# Patient Record
Sex: MALE | Race: OTHER | NOT HISPANIC OR LATINO
[De-identification: names, ages, dates, MRNs, and addresses within clinical notes are randomized per-mention and may not be internally consistent; named-entity substitution may affect disease eponyms.]

---

## 2020-04-26 ENCOUNTER — MESSAGE (OUTPATIENT)
Age: 30
End: 2020-04-26

## 2020-05-06 ENCOUNTER — APPOINTMENT (OUTPATIENT)
Dept: DISASTER EMERGENCY | Facility: HOSPITAL | Age: 30
End: 2020-05-06

## 2020-05-07 LAB
SARS-COV-2 IGG SERPL IA-ACNC: 1 AU/ML
SARS-COV-2 IGG SERPL QL IA: NEGATIVE

## 2020-05-11 ENCOUNTER — APPOINTMENT (OUTPATIENT)
Dept: DISASTER EMERGENCY | Facility: HOSPITAL | Age: 30
End: 2020-05-11

## 2023-04-12 PROBLEM — Z00.00 ENCOUNTER FOR PREVENTIVE HEALTH EXAMINATION: Status: ACTIVE | Noted: 2023-04-12

## 2023-04-26 ENCOUNTER — APPOINTMENT (OUTPATIENT)
Dept: OTOLARYNGOLOGY | Facility: CLINIC | Age: 33
End: 2023-04-26
Payer: COMMERCIAL

## 2023-04-26 DIAGNOSIS — J34.89 OTHER SPECIFIED DISORDERS OF NOSE AND NASAL SINUSES: ICD-10-CM

## 2023-04-26 PROCEDURE — 31231 NASAL ENDOSCOPY DX: CPT

## 2023-04-26 PROCEDURE — 99204 OFFICE O/P NEW MOD 45 MIN: CPT | Mod: 25

## 2023-04-26 NOTE — HISTORY OF PRESENT ILLNESS
[de-identified] : Right side more than left nasal obstruction, recurrent sinus infections\par no epistaxis, some bloody secreitons\par frequent throat clearing, concerned for PND\par no reflux issues, no dysphagia\par no meds\par tried flonase and saline, no effect, can't tolerate it due to HA\par some facial pain and headaches\par no PMH, no PSH\par has to clear by lying on side\par FH NSD\par no SOB, no otologic issues, no AOM, no HL\par 1-2 infections per year for many years\par

## 2023-04-28 ENCOUNTER — APPOINTMENT (OUTPATIENT)
Dept: CT IMAGING | Facility: HOSPITAL | Age: 33
End: 2023-04-28

## 2023-04-28 ENCOUNTER — OUTPATIENT (OUTPATIENT)
Dept: OUTPATIENT SERVICES | Facility: HOSPITAL | Age: 33
LOS: 1 days | End: 2023-04-28
Payer: COMMERCIAL

## 2023-04-28 DIAGNOSIS — J34.89 OTHER SPECIFIED DISORDERS OF NOSE AND NASAL SINUSES: ICD-10-CM

## 2023-04-28 PROCEDURE — 70486 CT MAXILLOFACIAL W/O DYE: CPT | Mod: 26

## 2023-05-30 ENCOUNTER — APPOINTMENT (OUTPATIENT)
Dept: OTOLARYNGOLOGY | Facility: CLINIC | Age: 33
End: 2023-05-30
Payer: COMMERCIAL

## 2023-05-30 PROCEDURE — 99214 OFFICE O/P EST MOD 30 MIN: CPT | Mod: 95

## 2023-05-30 RX ORDER — IPRATROPIUM BROMIDE 42 UG/1
0.06 SPRAY NASAL
Qty: 1 | Refills: 1 | Status: ACTIVE | COMMUNITY
Start: 2023-05-30 | End: 1900-01-01

## 2023-06-04 NOTE — HISTORY OF PRESENT ILLNESS
[de-identified] : Right side more than left nasal obstruction, recurrent sinus infections\par has tried sprays multiple times since last visit, couldn't tolerate, gave migraines or congestion\par no epistaxis, mild dried blood\par no reflux issues, no dysphagia\par some facial pain and headaches\par no PMH, no PSH\par has to clear by lying on side\par FH NSD\par no SOB, no otologic issues, no AOM, no HL\par 1-2 infections per year for many years\par

## 2023-08-23 ENCOUNTER — APPOINTMENT (OUTPATIENT)
Dept: OTOLARYNGOLOGY | Facility: CLINIC | Age: 33
End: 2023-08-23
Payer: COMMERCIAL

## 2023-08-23 VITALS
SYSTOLIC BLOOD PRESSURE: 165 MMHG | WEIGHT: 280 LBS | DIASTOLIC BLOOD PRESSURE: 112 MMHG | HEIGHT: 71 IN | HEART RATE: 93 BPM | OXYGEN SATURATION: 97 % | BODY MASS INDEX: 39.2 KG/M2

## 2023-08-23 DIAGNOSIS — R09.89 OTHER SPECIFIED SYMPTOMS AND SIGNS INVOLVING THE CIRCULATORY AND RESPIRATORY SYSTEMS: ICD-10-CM

## 2023-08-23 DIAGNOSIS — J34.3 HYPERTROPHY OF NASAL TURBINATES: ICD-10-CM

## 2023-08-23 DIAGNOSIS — R09.81 NASAL CONGESTION: ICD-10-CM

## 2023-08-23 DIAGNOSIS — J34.2 DEVIATED NASAL SEPTUM: ICD-10-CM

## 2023-08-23 DIAGNOSIS — J32.9 CHRONIC SINUSITIS, UNSPECIFIED: ICD-10-CM

## 2023-08-23 PROCEDURE — 31231 NASAL ENDOSCOPY DX: CPT

## 2023-08-23 PROCEDURE — 99214 OFFICE O/P EST MOD 30 MIN: CPT | Mod: 25

## 2023-08-23 RX ORDER — AZELASTINE HYDROCHLORIDE 137 UG/1
0.1 SPRAY, METERED NASAL TWICE DAILY
Qty: 1 | Refills: 3 | Status: COMPLETED | COMMUNITY
Start: 2023-04-26 | End: 2023-08-23

## 2023-08-23 RX ORDER — MOMETASONE 50 UG/1
50 SPRAY, METERED NASAL
Qty: 1 | Refills: 1 | Status: COMPLETED | COMMUNITY
Start: 2023-04-26 | End: 2023-08-23

## 2023-08-23 NOTE — HISTORY OF PRESENT ILLNESS
[de-identified] : 33 year old male follow up CRS States continues to have nasal congestion R>L, using ipratropium nasal sprays with improvement in dryness but not congestion.  Reports intermittent sinus pain and pressure,  Reports intermittent blood in mucus, denies epistaxis. Denies sinus infections since last visit, anterior rhinorrhea.  Patient's blood pressure was  165/112 . Patient was advised to follow up with PCP for high blood pressure.

## 2023-10-04 ENCOUNTER — OUTPATIENT (OUTPATIENT)
Dept: OUTPATIENT SERVICES | Facility: HOSPITAL | Age: 33
LOS: 1 days | End: 2023-10-04
Payer: COMMERCIAL

## 2023-10-04 VITALS
DIASTOLIC BLOOD PRESSURE: 92 MMHG | SYSTOLIC BLOOD PRESSURE: 172 MMHG | HEIGHT: 70.5 IN | RESPIRATION RATE: 18 BRPM | WEIGHT: 293.66 LBS | OXYGEN SATURATION: 98 % | TEMPERATURE: 98 F | HEART RATE: 100 BPM

## 2023-10-04 DIAGNOSIS — J34.3 HYPERTROPHY OF NASAL TURBINATES: ICD-10-CM

## 2023-10-04 DIAGNOSIS — J34.2 DEVIATED NASAL SEPTUM: ICD-10-CM

## 2023-10-04 DIAGNOSIS — Z98.890 OTHER SPECIFIED POSTPROCEDURAL STATES: Chronic | ICD-10-CM

## 2023-10-04 DIAGNOSIS — J34.89 OTHER SPECIFIED DISORDERS OF NOSE AND NASAL SINUSES: ICD-10-CM

## 2023-10-04 DIAGNOSIS — Z01.818 ENCOUNTER FOR OTHER PREPROCEDURAL EXAMINATION: ICD-10-CM

## 2023-10-04 DIAGNOSIS — K08.409 PARTIAL LOSS OF TEETH, UNSPECIFIED CAUSE, UNSPECIFIED CLASS: Chronic | ICD-10-CM

## 2023-10-04 PROCEDURE — 80048 BASIC METABOLIC PNL TOTAL CA: CPT

## 2023-10-04 PROCEDURE — 93005 ELECTROCARDIOGRAM TRACING: CPT

## 2023-10-04 PROCEDURE — 93010 ELECTROCARDIOGRAM REPORT: CPT | Mod: NC

## 2023-10-04 PROCEDURE — G0463: CPT

## 2023-10-04 PROCEDURE — 85027 COMPLETE CBC AUTOMATED: CPT

## 2023-10-04 PROCEDURE — 36415 COLL VENOUS BLD VENIPUNCTURE: CPT

## 2023-10-04 NOTE — H&P PST ADULT - NSANTHOSAYNRD_GEN_A_CORE
No. STANLEY screening performed.  STOP BANG Legend: 0-2 = LOW Risk; 3-4 = INTERMEDIATE Risk; 5-8 = HIGH Risk neck 17.5 in/No. STANLEY screening performed.  STOP BANG Legend: 0-2 = LOW Risk; 3-4 = INTERMEDIATE Risk; 5-8 = HIGH Risk

## 2023-10-04 NOTE — H&P PST ADULT - COMMENTS
Denies h/o or family h/o DVT, PE  Denies bleeding or clotting disorders  Denies dentures/partials, loose teeth/caps Reports h/o thalassemia trait  Denies h/o or family h/o DVT, PE  Denies dentures/partials, loose teeth/caps 157/111 electronic BP

## 2023-10-04 NOTE — H&P PST ADULT - PROBLEM SELECTOR PLAN 1
Patient provided with pre-operative instructions and verbalized understanding.  Patient can take ________ but otherwise will be NPO on day of surgery. Patient will stop NSAIDs, aspirin, herbal supplements or vitamins 1 week prior to surgery.  Chlorhexidine wash provided with instructions, verbalized understanding. Pending medical clearance as per surgeon. Patient provided with pre-operative instructions and verbalized understanding.  Patient will be NPO on day of surgery. Patient will stop NSAIDs, aspirin, herbal supplements or vitamins 1 week prior to surgery.      Pending medical clearance due to elevated BP readings in PST.    EKG, CBC, BMP collected, pending results

## 2023-10-04 NOTE — H&P PST ADULT - MUSCULOSKELETAL
normal/ROM intact/no joint swelling/no calf tenderness/normal gait/strength 5/5 bilateral upper extremities/strength 5/5 bilateral lower extremities negative normal/ROM intact/no joint swelling/no calf tenderness/normal gait

## 2023-10-04 NOTE — H&P PST ADULT - HISTORY OF PRESENT ILLNESS
Patient is a 33 year old M with PMHx of // with no pertinent medical history presents for perioperative testing for septoplasty, turbinate reduction, nasal endoscopy with Dr. Sebas Cabrera scheduled for 10/13/2023. Denies any acute symptoms at this time. Reports feeling well overall.     sinus congestion, sinus infection - last winter with abx PO, sinus pressure   has used nasal spray decongestants x several years  Patient is a 33 year old M with PMHx of thalassemia trait, does not follow up with hematology presents for perioperative testing for septoplasty, turbinate reduction, nasal endoscopy with Dr. Sebas Cabrera scheduled for 10/13/2023. Reports recurrent sinus congestion and sinus infections, last infection treated with PO abx was last winter, and sinus pressure for years. Pt has used nasal sprays and decongestants without relief in symptoms. Pt has opted for surgical intervention. Denies HA, blurry vision, dizziness or any acute symptoms at this time. Reports feeling well overall.

## 2023-10-04 NOTE — H&P PST ADULT - NSICDXFAMILYHX_GEN_ALL_CORE_FT
FAMILY HISTORY:  Father  Still living? Unknown  Family history of diabetes mellitus (DM), Age at diagnosis: Age Unknown    Mother  Still living? Unknown  FH: HTN (hypertension), Age at diagnosis: Age Unknown

## 2023-10-04 NOTE — H&P PST ADULT - ENMT
Medical Necessity Information: It is in your best interest to select a reason for this procedure from the list below. All of these items fulfill various CMS LCD requirements except the new and changing color options. Consent: The patient's consent was obtained including but not limited to risks of crusting, scabbing, blistering, scarring, darker or lighter pigmentary change, recurrence, incomplete removal and infection. Post-Care Instructions: I reviewed with the patient in detail post-care instructions. Patient is to wear sunprotection, and avoid picking at any of the treated lesions. Pt may apply Vaseline to crusted or scabbing areas. Medical Necessity Clause: This procedure was medically necessary because the lesions that were treated were: Detail Level: Detailed Add 52 Modifier (Optional): no pharynx/no gross abnormalities/neck details…

## 2023-10-04 NOTE — H&P PST ADULT - NSICDXPASTMEDICALHX_GEN_ALL_CORE_FT
PAST MEDICAL HISTORY:  No pertinent past medical history PAST MEDICAL HISTORY:  Thalassemia trait

## 2023-10-12 ENCOUNTER — TRANSCRIPTION ENCOUNTER (OUTPATIENT)
Age: 33
End: 2023-10-12

## 2023-10-13 ENCOUNTER — RESULT REVIEW (OUTPATIENT)
Age: 33
End: 2023-10-13

## 2023-10-13 ENCOUNTER — APPOINTMENT (OUTPATIENT)
Dept: OTOLARYNGOLOGY | Facility: HOSPITAL | Age: 33
End: 2023-10-13

## 2023-10-13 ENCOUNTER — OUTPATIENT (OUTPATIENT)
Dept: OUTPATIENT SERVICES | Facility: HOSPITAL | Age: 33
LOS: 1 days | End: 2023-10-13
Payer: COMMERCIAL

## 2023-10-13 ENCOUNTER — TRANSCRIPTION ENCOUNTER (OUTPATIENT)
Age: 33
End: 2023-10-13

## 2023-10-13 VITALS
DIASTOLIC BLOOD PRESSURE: 95 MMHG | HEART RATE: 96 BPM | RESPIRATION RATE: 16 BRPM | SYSTOLIC BLOOD PRESSURE: 155 MMHG | TEMPERATURE: 97 F | OXYGEN SATURATION: 98 %

## 2023-10-13 VITALS
OXYGEN SATURATION: 96 % | TEMPERATURE: 98 F | SYSTOLIC BLOOD PRESSURE: 160 MMHG | RESPIRATION RATE: 15 BRPM | DIASTOLIC BLOOD PRESSURE: 98 MMHG | HEART RATE: 100 BPM | WEIGHT: 293.66 LBS | HEIGHT: 70.5 IN

## 2023-10-13 DIAGNOSIS — K08.409 PARTIAL LOSS OF TEETH, UNSPECIFIED CAUSE, UNSPECIFIED CLASS: Chronic | ICD-10-CM

## 2023-10-13 DIAGNOSIS — J34.2 DEVIATED NASAL SEPTUM: ICD-10-CM

## 2023-10-13 DIAGNOSIS — Z98.890 OTHER SPECIFIED POSTPROCEDURAL STATES: Chronic | ICD-10-CM

## 2023-10-13 DIAGNOSIS — J34.89 OTHER SPECIFIED DISORDERS OF NOSE AND NASAL SINUSES: ICD-10-CM

## 2023-10-13 DIAGNOSIS — J34.3 HYPERTROPHY OF NASAL TURBINATES: ICD-10-CM

## 2023-10-13 PROCEDURE — 30420 RECONSTRUCTION OF NOSE: CPT

## 2023-10-13 PROCEDURE — 30140 RESECT INFERIOR TURBINATE: CPT | Mod: 50

## 2023-10-13 PROCEDURE — 88300 SURGICAL PATH GROSS: CPT | Mod: 26

## 2023-10-13 PROCEDURE — 30520 REPAIR OF NASAL SEPTUM: CPT

## 2023-10-13 PROCEDURE — 88300 SURGICAL PATH GROSS: CPT

## 2023-10-13 PROCEDURE — C1889: CPT

## 2023-10-13 PROCEDURE — C9399: CPT

## 2023-10-13 PROCEDURE — 31231 NASAL ENDOSCOPY DX: CPT | Mod: 59

## 2023-10-13 DEVICE — STAPLER SEPTAL ENTACT: Type: IMPLANTABLE DEVICE | Status: FUNCTIONAL

## 2023-10-13 RX ORDER — CEPHALEXIN 500 MG
1 CAPSULE ORAL
Qty: 21 | Refills: 0
Start: 2023-10-13 | End: 2023-10-19

## 2023-10-13 RX ORDER — OXYCODONE HYDROCHLORIDE 5 MG/1
1 TABLET ORAL
Qty: 12 | Refills: 0
Start: 2023-10-13 | End: 2023-10-15

## 2023-10-13 RX ORDER — HYDROMORPHONE HYDROCHLORIDE 2 MG/ML
0.5 INJECTION INTRAMUSCULAR; INTRAVENOUS; SUBCUTANEOUS
Refills: 0 | Status: DISCONTINUED | OUTPATIENT
Start: 2023-10-13 | End: 2023-10-27

## 2023-10-13 RX ORDER — OXYCODONE AND ACETAMINOPHEN 5; 325 MG/1; MG/1
1 TABLET ORAL EVERY 4 HOURS
Refills: 0 | Status: DISCONTINUED | OUTPATIENT
Start: 2023-10-13 | End: 2023-10-13

## 2023-10-13 RX ORDER — HYDROMORPHONE HYDROCHLORIDE 2 MG/ML
0.5 INJECTION INTRAMUSCULAR; INTRAVENOUS; SUBCUTANEOUS ONCE
Refills: 0 | Status: DISCONTINUED | OUTPATIENT
Start: 2023-10-13 | End: 2023-10-13

## 2023-10-13 RX ORDER — ONDANSETRON 8 MG/1
4 TABLET, FILM COATED ORAL ONCE
Refills: 0 | Status: DISCONTINUED | OUTPATIENT
Start: 2023-10-13 | End: 2023-10-13

## 2023-10-13 RX ORDER — SODIUM CHLORIDE 9 MG/ML
1000 INJECTION, SOLUTION INTRAVENOUS
Refills: 0 | Status: DISCONTINUED | OUTPATIENT
Start: 2023-10-13 | End: 2023-10-13

## 2023-10-13 RX ADMIN — HYDROMORPHONE HYDROCHLORIDE 0.5 MILLIGRAM(S): 2 INJECTION INTRAMUSCULAR; INTRAVENOUS; SUBCUTANEOUS at 11:36

## 2023-10-13 RX ADMIN — HYDROMORPHONE HYDROCHLORIDE 0.5 MILLIGRAM(S): 2 INJECTION INTRAMUSCULAR; INTRAVENOUS; SUBCUTANEOUS at 11:17

## 2023-10-13 RX ADMIN — HYDROMORPHONE HYDROCHLORIDE 0.5 MILLIGRAM(S): 2 INJECTION INTRAMUSCULAR; INTRAVENOUS; SUBCUTANEOUS at 12:16

## 2023-10-13 RX ADMIN — HYDROMORPHONE HYDROCHLORIDE 0.5 MILLIGRAM(S): 2 INJECTION INTRAMUSCULAR; INTRAVENOUS; SUBCUTANEOUS at 11:30

## 2023-10-13 NOTE — BRIEF OPERATIVE NOTE - NSICDXBRIEFPREOP_GEN_ALL_CORE_FT
PRE-OP DIAGNOSIS:  Nasal septal deviation 13-Oct-2023 11:28:36  Adalberto Juares  Nasal obstruction 13-Oct-2023 11:28:44  Adalberto Juares  Nasal turbinate hypertrophy 13-Oct-2023 11:28:56  Adalberto Juares

## 2023-10-13 NOTE — ASU DISCHARGE PLAN (ADULT/PEDIATRIC) - CARE PROVIDER_API CALL
Sebas Cabrera  Otolaryngology  [address] [place] [id number]  Phone: [phone]  Fax: [phone]  Established Patient  Scheduled Appointment: 10/20/2023 08:30 AM

## 2023-10-13 NOTE — BRIEF OPERATIVE NOTE - NSICDXBRIEFPROCEDURE_GEN_ALL_CORE_FT
PROCEDURES:  Septoplasty, nose 13-Oct-2023 11:02:16  Sebas Cabrera  Turbinate resection 13-Oct-2023 11:02:26  Sebas Cabrera  Endoscopy, nasal, adult 13-Oct-2023 11:02:37  Sebas Cabrera

## 2023-10-13 NOTE — BRIEF OPERATIVE NOTE - NSICDXBRIEFPOSTOP_GEN_ALL_CORE_FT
POST-OP DIAGNOSIS:  Nasal obstruction 13-Oct-2023 11:29:10  Adalberto Juares  Nasal septal deviation 13-Oct-2023 11:34:16  Adalberto Juares  Nasal turbinate hypertrophy 13-Oct-2023 11:34:25  Adalberto Juares

## 2023-10-13 NOTE — ASU PATIENT PROFILE, ADULT - FALL HARM RISK - UNIVERSAL INTERVENTIONS
Bed in lowest position, wheels locked, appropriate side rails in place/Call bell, personal items and telephone in reach/Instruct patient to call for assistance before getting out of bed or chair/Non-slip footwear when patient is out of bed/[place] to call system/Physically safe environment - no spills, clutter or unnecessary equipment/Purposeful Proactive Rounding/Room/bathroom lighting operational, light cord in reach

## 2023-10-13 NOTE — ASU DISCHARGE PLAN (ADULT/PEDIATRIC) - PROVIDER TOKENS
PROVIDER:[TOKEN:[[id number]],SCHEDULEDAPPT:[10/20/2023],SCHEDULEDAPPTTIME:[08:30 AM],ESTABLISHEDPATIENT:[T]]

## 2023-10-20 ENCOUNTER — APPOINTMENT (OUTPATIENT)
Dept: OTOLARYNGOLOGY | Facility: CLINIC | Age: 33
End: 2023-10-20
Payer: COMMERCIAL

## 2023-10-20 PROCEDURE — 99024 POSTOP FOLLOW-UP VISIT: CPT

## 2023-10-20 RX ORDER — MUPIROCIN 20 MG/G
2 OINTMENT TOPICAL 3 TIMES DAILY
Qty: 1 | Refills: 1 | Status: ACTIVE | COMMUNITY
Start: 2023-10-20 | End: 1900-01-01

## 2023-11-11 PROBLEM — D56.3 THALASSEMIA MINOR: Chronic | Status: ACTIVE | Noted: 2023-10-04

## 2023-11-14 ENCOUNTER — APPOINTMENT (OUTPATIENT)
Dept: OTOLARYNGOLOGY | Facility: CLINIC | Age: 33
End: 2023-11-14

## 2024-03-06 ENCOUNTER — APPOINTMENT (OUTPATIENT)
Dept: OTOLARYNGOLOGY | Facility: CLINIC | Age: 34
End: 2024-03-06
Payer: COMMERCIAL

## 2024-03-06 VITALS — WEIGHT: 270 LBS | BODY MASS INDEX: 37.8 KG/M2 | HEIGHT: 71 IN

## 2024-03-06 PROCEDURE — 99213 OFFICE O/P EST LOW 20 MIN: CPT | Mod: 25

## 2024-03-06 PROCEDURE — 31231 NASAL ENDOSCOPY DX: CPT

## 2024-03-06 RX ORDER — TIRZEPATIDE 15 MG/.5ML
INJECTION, SOLUTION SUBCUTANEOUS
Refills: 0 | Status: ACTIVE | COMMUNITY

## 2024-03-06 RX ORDER — MUPIROCIN 20 MG/G
2 OINTMENT TOPICAL 3 TIMES DAILY
Qty: 1 | Refills: 0 | Status: ACTIVE | COMMUNITY
Start: 2024-03-06 | End: 1900-01-01

## 2024-03-06 RX ORDER — AMLODIPINE BESYLATE 5 MG/1
TABLET ORAL
Refills: 0 | Status: ACTIVE | COMMUNITY

## 2024-03-06 NOTE — HISTORY OF PRESENT ILLNESS
[de-identified] : 33yM with history of chronic nasal obstruction with septal deviation and turbinate hypertrophy, chronic PND Now s/p Septoplasty, nasal endoscopy, and bilateral submucosal inferior turbinate resection 10/13/23 Reports doing well overall -- little no migraines, easier to breathe, PND resolved.  Using saline rinses PRN   Denies recent fevers or infections

## 2024-12-20 ENCOUNTER — APPOINTMENT (OUTPATIENT)
Dept: OTOLARYNGOLOGY | Facility: CLINIC | Age: 34
End: 2024-12-20
Payer: COMMERCIAL

## 2024-12-20 VITALS — WEIGHT: 270 LBS | HEIGHT: 71 IN | BODY MASS INDEX: 37.8 KG/M2

## 2024-12-20 PROCEDURE — 99214 OFFICE O/P EST MOD 30 MIN: CPT | Mod: 25

## 2024-12-20 PROCEDURE — 31231 NASAL ENDOSCOPY DX: CPT

## (undated) DEVICE — MARKING PEN W RULER

## (undated) DEVICE — Device

## (undated) DEVICE — S&N ARTHROCARE ENT WAND TURBINATOR

## (undated) DEVICE — VISITEC 4X4

## (undated) DEVICE — VENODYNE/SCD SLEEVE CALF MEDIUM

## (undated) DEVICE — GLV 7.5 PROTEXIS (WHITE)

## (undated) DEVICE — SUT PLAIN GUT 4-0 18" SC-1

## (undated) DEVICE — CANISTER DISPOSABLE THIN WALL 3000CC

## (undated) DEVICE — SYR LUER LOK 5CC

## (undated) DEVICE — SUT SILK 3-0 18" FS-1

## (undated) DEVICE — PACK SMR

## (undated) DEVICE — CATH IV SAFE INSYTE 14G X 1.75" (ORANGE)

## (undated) DEVICE — POSITIONER STRAP ARMBOARD VELCRO TS-30

## (undated) DEVICE — GLV 7 PROTEXIS (WHITE)

## (undated) DEVICE — PACKING GAUZE PLAIN 0.5"

## (undated) DEVICE — ELCTR GROUNDING PAD ADULT COVIDIEN

## (undated) DEVICE — MARKING PEN W RULER / LABELS

## (undated) DEVICE — SUT CHROMIC 4-0 18" G-2

## (undated) DEVICE — LABELS BLANK W PEN

## (undated) DEVICE — WARMING BLANKET LOWER ADULT

## (undated) DEVICE — SUT ETHILON 3-0 18" FS-1

## (undated) DEVICE — SOL IRR POUR NS 0.9% 500ML

## (undated) DEVICE — DRSG TELFA 3 X 8

## (undated) DEVICE — DRSG SPLINT INTRA NASAL .5MM OVERSIZE THICK